# Patient Record
Sex: MALE | Race: WHITE | NOT HISPANIC OR LATINO | Employment: OTHER | URBAN - METROPOLITAN AREA
[De-identification: names, ages, dates, MRNs, and addresses within clinical notes are randomized per-mention and may not be internally consistent; named-entity substitution may affect disease eponyms.]

---

## 2017-03-29 ENCOUNTER — CONSULT (OUTPATIENT)
Dept: CARDIOLOGY | Facility: CLINIC | Age: 47
End: 2017-03-29

## 2017-03-29 VITALS
HEART RATE: 77 BPM | DIASTOLIC BLOOD PRESSURE: 72 MMHG | WEIGHT: 220.2 LBS | BODY MASS INDEX: 30.83 KG/M2 | SYSTOLIC BLOOD PRESSURE: 112 MMHG | HEIGHT: 71 IN

## 2017-03-29 DIAGNOSIS — I48.0 PAROXYSMAL ATRIAL FIBRILLATION (HCC): Primary | ICD-10-CM

## 2017-03-29 DIAGNOSIS — G47.33 OSA (OBSTRUCTIVE SLEEP APNEA): ICD-10-CM

## 2017-03-29 DIAGNOSIS — R55 VASOVAGAL SYNCOPE: ICD-10-CM

## 2017-03-29 PROCEDURE — 93000 ELECTROCARDIOGRAM COMPLETE: CPT | Performed by: INTERNAL MEDICINE

## 2017-03-29 PROCEDURE — 99204 OFFICE O/P NEW MOD 45 MIN: CPT | Performed by: INTERNAL MEDICINE

## 2017-03-29 RX ORDER — ASPIRIN 81 MG/1
81 TABLET ORAL DAILY
COMMUNITY

## 2017-03-29 NOTE — PROGRESS NOTES
Cardiology Consult     Brian Davis  1970  [phone]      03/29/17      [facility] CARDIOLOGY    Jody Eva Sunny, APRN  [address] / [place] [address]    Chief Complaint   Patient presents with   • Atrial Fibrillation       1) Atrial Fibrillation     A)CHADSVASC= 1 on ASA    B)Prior episodes of Afib presenting with syncope- 2 requiring cardioversion     C)Echocardiogram 1/5/17: Normal LV size EF 60%, Trace MR, trace MR mild TR, LA 4.0cm. IVS 1.2, LVPW 1.0      D) Ziopatch 1/2017: results unknown   2) Syncope - probably vasovagal   3)Hypertension   4)Hyperlipidemia   5)Chronic Kidney Disease: Last Cr 1.05 1/4/17  6) Surgical History: cholecystectomy  7) Probable Sleep Apnea: sleep study 1/23/12: 39 snoring episodes with average O2 sat at 93% with lowest at 82%, 119 desats       History of Present Illness:   Mr. Davis is a 57 year old white male who is self referred for consultation for Atrial fibrillation. His History of atrial fibrillation dates back to 2008. In 2008, then in 2010 he had episodes of atrial fibrillation requiring cardioversion. During both of these episodes he had a syncopal episode. He did well for years and was not on any antiarrhythmic medications and did not follow with a cardiologst. Then on New Year's ayala 2016 he a experienced another syncope episode. This was similar to the previous episodes years ago in that he felt hot and sweaty prior to the episode. He felt dizzy and sweaty at his seat and got up to go to the bathroom. He sat down and then passed out. He thinks he was out for a few minutes. When he came to, he was soaked in sweat. He was not confused and did not lose control of his bowel or bladder. He states that his pulse was irregular. He did not go to the ER after this episode. The prior episodes were also in the setting of alcohol intake. He does drink ETOH regularly, he admits to a couple beers 3-4 times per week. He used to drink hard  liquor but has stopped this since January. He drinks 1 cup of coffee daily. BP does not run high. No tobacco. No recent hospitalizations or ER visits. He is going to [facility] on a vacation in May and wanted to be checked out before he goes. He has not had formal sleep study. He does have symptoms such as snoring and apneic spells.     No Known Allergies     Cannot display prior to admission medications because the patient has not been admitted in this contact.            Current Outpatient Prescriptions:   •  aspirin 81 MG EC tablet, Take 81 mg by mouth Daily., Disp: , Rfl:     Social History     Social History   • Marital status:      Spouse name: N/A   • Number of children: N/A   • Years of education: N/A     Social History Main Topics   • Smoking status: Never Smoker   • Smokeless tobacco: Never Used   • Alcohol use Yes   • Drug use: No   • Sexual activity: Defer     Other Topics Concern   • None     Social History Narrative   • None     Family History: Sister has bradycardia, no PM, she has episodes of syncope.      REVIEW OF SYSTEMS:   CONST:  No weight loss, fever, chills, weakness or fatigue.   HEENT:  No visual loss, blurred vision, double vision, yellow sclerae.                   No hearing loss, congestion, sore throat.   SKIN:      No rashes, urticaria, ulcers, sores.     RESP:     No shortness of breath, hemoptysis, cough, sputum.   GI:           No anorexia, nausea, vomiting, diarrhea. No abdominal pain, melena.   :         No burning on urination, hematuria or increased frequency.  ENDO:    No diaphoresis, cold or heat intolerance. No polyuria or polydipsia.   NEURO:  No headache, dizziness, syncope, paralysis, ataxia, or parasthesias.                  No change in bowel or bladder control. No history of CVA/TIA  MUSC:    No muscle, back pain, joint pain or stiffness.   HEME:    No anemia, bleeding, bruising. No history of DVT/PE.  PSYCH:  No history of depression, anxiety    Vitals:    03/29/17  "1011   BP: 112/72   BP Location: Left arm   Patient Position: Sitting   Pulse: 77   Weight: 220 lb 3.2 oz (99.9 kg)   Height: 71\" (180.3 cm)       Physical Exam:  GEN: Well nourished, Well- developed  No acute distress  HEENT: Normocephalic, Atraumatic, PERRLA, moist mucous membranes  NECK: supple, NO JVD, no thyromegaly, no lymphadenopathy  CARD: S1S2  RRR no murmur, gallop, rub  LUNGS: Clear to auscultataion, normal respiratory effort  ABDOMEN: Soft, nontender, normal bowel sounds  EXTREMITIES:No gross deformities,  No clubbing, cyanosis, or edema  SKIN: Warm, dry  NEURO: No focal deficits  PSYCHIATRIC: Normal affect and mood      Data:                                        ECG 12 Lead  Date/Time: 3/29/2017 10:29 AM  Performed by: CARMELINA MEJIA  Authorized by: CARMELINA MEJIA   Rhythm: sinus rhythm  Ectopy: unifocal PVCs  BPM: 77              Assessment and Plan:   1. Paroxysmal atrial fibrillation    2. Vasovagal syncope    3. VALERIE (obstructive sleep apnea)      1. Paroxysmal Atrial Fibrillation- 2 documented and probably a recent third episode, related to ETOH intake and in the setting of vasovagal syncope. He needs to check his heart rates. His echo showed normal Structure and function. He does not want to take medications at this time.   CHADSvasc = 1 continue ASA 81 mg daily. Dr. Mejia has discussed with the patient the use of prn anticoagulation with Xarelto. We have given him samples today.   2. Vasovagal Syncope- usually in the setting of alcohol intake.   3. Probable Sleep Apnea-  Consider outpatient sleep study. Encouraged weight loss and exercise.       Scribed for Carmelina Mejia MD by Jennifer Thomas PA-C. 3/29/2017  11:03 AM     I, Carmelina Mejia MD, personally performed the services face to face as described in this documentation and as scribed by the above named individual in my presence, and it is both accurate and complete.  3/29/2017  11:08 AM            "

## 2017-05-08 ENCOUNTER — TELEPHONE (OUTPATIENT)
Dept: CARDIOLOGY | Facility: CLINIC | Age: 47
End: 2017-05-08

## 2017-05-08 DIAGNOSIS — I47.29 NONSUSTAINED VENTRICULAR TACHYCARDIA (HCC): Primary | ICD-10-CM

## 2017-05-08 DIAGNOSIS — R55 VASOVAGAL SYNCOPE: ICD-10-CM

## 2017-05-17 ENCOUNTER — HOSPITAL ENCOUNTER (OUTPATIENT)
Dept: CARDIOLOGY | Facility: HOSPITAL | Age: 47
Discharge: HOME | End: 2017-05-17
Attending: INTERNAL MEDICINE | Admitting: INTERNAL MEDICINE

## 2017-05-17 ENCOUNTER — HOSPITAL ENCOUNTER (OUTPATIENT)
Dept: CARDIOLOGY | Facility: HOSPITAL | Age: 47
Discharge: HOME | End: 2017-05-17
Attending: INTERNAL MEDICINE

## 2017-05-17 VITALS
SYSTOLIC BLOOD PRESSURE: 142 MMHG | HEIGHT: 71 IN | DIASTOLIC BLOOD PRESSURE: 96 MMHG | BODY MASS INDEX: 29.96 KG/M2 | WEIGHT: 214 LBS | HEART RATE: 72 BPM

## 2017-05-17 DIAGNOSIS — I47.29 NONSUSTAINED VENTRICULAR TACHYCARDIA (HCC): ICD-10-CM

## 2017-05-17 DIAGNOSIS — R55 VASOVAGAL SYNCOPE: ICD-10-CM

## 2017-05-17 LAB
BH CV STRESS BP STAGE 1: NORMAL
BH CV STRESS BP STAGE 2: NORMAL
BH CV STRESS BP STAGE 3: NORMAL
BH CV STRESS BP STAGE 4: NORMAL
BH CV STRESS DURATION MIN STAGE 1: 3
BH CV STRESS DURATION MIN STAGE 2: 3
BH CV STRESS DURATION MIN STAGE 3: 3
BH CV STRESS DURATION MIN STAGE 4: 1
BH CV STRESS DURATION SEC STAGE 1: 0
BH CV STRESS DURATION SEC STAGE 2: 0
BH CV STRESS DURATION SEC STAGE 3: 0
BH CV STRESS DURATION SEC STAGE 4: 41
BH CV STRESS GRADE STAGE 1: 10
BH CV STRESS GRADE STAGE 2: 12
BH CV STRESS GRADE STAGE 3: 14
BH CV STRESS GRADE STAGE 4: 16
BH CV STRESS HR STAGE 1: 100
BH CV STRESS HR STAGE 2: 116
BH CV STRESS HR STAGE 3: 142
BH CV STRESS HR STAGE 4: 155
BH CV STRESS METS STAGE 1: 5
BH CV STRESS METS STAGE 2: 7.5
BH CV STRESS METS STAGE 3: 10
BH CV STRESS METS STAGE 4: 13.5
BH CV STRESS PROTOCOL 1: NORMAL
BH CV STRESS RECOVERY BP: NORMAL MMHG
BH CV STRESS RECOVERY HR: 99 BPM
BH CV STRESS SPEED STAGE 1: 1.7
BH CV STRESS SPEED STAGE 2: 2.5
BH CV STRESS SPEED STAGE 3: 3.4
BH CV STRESS SPEED STAGE 4: 4.2
BH CV STRESS STAGE 1: 1
BH CV STRESS STAGE 2: 2
BH CV STRESS STAGE 3: 3
BH CV STRESS STAGE 4: 4
LV EF NUC BP: 67 %
MAXIMAL PREDICTED HEART RATE: 172 BPM
PERCENT MAX PREDICTED HR: 91.28 %
STRESS BASELINE BP: NORMAL MMHG
STRESS BASELINE HR: 75 BPM
STRESS PERCENT HR: 107 %
STRESS POST ESTIMATED WORKLOAD: 13 METS
STRESS POST EXERCISE DUR MIN: 10 MIN
STRESS POST EXERCISE DUR SEC: 41 SEC
STRESS POST PEAK BP: NORMAL MMHG
STRESS POST PEAK HR: 157 BPM
STRESS TARGET HR: 146 BPM

## 2017-05-17 PROCEDURE — A9500 TC99M SESTAMIBI: HCPCS | Performed by: INTERNAL MEDICINE

## 2017-05-17 PROCEDURE — 78452 HT MUSCLE IMAGE SPECT MULT: CPT

## 2017-05-17 PROCEDURE — 0 TECHNETIUM SESTAMIBI: Performed by: INTERNAL MEDICINE

## 2017-05-17 PROCEDURE — 93017 CV STRESS TEST TRACING ONLY: CPT

## 2017-05-17 PROCEDURE — 78452 HT MUSCLE IMAGE SPECT MULT: CPT | Performed by: INTERNAL MEDICINE

## 2017-05-17 PROCEDURE — 93018 CV STRESS TEST I&R ONLY: CPT | Performed by: INTERNAL MEDICINE

## 2017-05-17 RX ADMIN — Medication 1 DOSE: at 08:45

## 2017-05-17 RX ADMIN — Medication 1 DOSE: at 10:55

## 2017-05-18 ENCOUNTER — APPOINTMENT (OUTPATIENT)
Dept: CARDIOLOGY | Facility: HOSPITAL | Age: 47
End: 2017-05-18
Attending: INTERNAL MEDICINE

## 2017-05-18 ENCOUNTER — DOCUMENTATION (OUTPATIENT)
Dept: CARDIOLOGY | Facility: CLINIC | Age: 47
End: 2017-05-18

## 2024-11-13 ENCOUNTER — OFFICE VISIT (OUTPATIENT)
Dept: CARDIOLOGY | Facility: CLINIC | Age: 54
End: 2024-11-13
Payer: COMMERCIAL

## 2024-11-13 VITALS
BODY MASS INDEX: 31.25 KG/M2 | WEIGHT: 223.2 LBS | HEIGHT: 71 IN | HEART RATE: 68 BPM | DIASTOLIC BLOOD PRESSURE: 84 MMHG | SYSTOLIC BLOOD PRESSURE: 123 MMHG | OXYGEN SATURATION: 97 %

## 2024-11-13 DIAGNOSIS — R55 SYNCOPE AND COLLAPSE: Primary | ICD-10-CM

## 2024-11-13 PROCEDURE — 99204 OFFICE O/P NEW MOD 45 MIN: CPT | Performed by: INTERNAL MEDICINE

## 2024-11-13 PROCEDURE — 93000 ELECTROCARDIOGRAM COMPLETE: CPT | Performed by: INTERNAL MEDICINE

## 2024-11-13 NOTE — PROGRESS NOTES
"Subjective:     Encounter Date:11/13/2024    Primary Care Physician: Jody Correa APRN      Patient ID: Brian Davis is a 54 y.o. male.    Chief Complaint:Consult, Rapid Heart Rate, and Atrial Fibrillation    PROBLEM LIST:  History of atrial fibrillation  Previously seen Dr. Mejia  2017 normal MPS  Recurrence, following an episode of syncope 9/24  Dyslipidemia  Surgeries:  Spinal surgery  Cholecystectomy   Ureter surgery  Elbow surgery      No Known Allergies      Current Outpatient Medications:     aspirin 81 MG EC tablet, Take 81 mg by mouth Daily. (Patient taking differently: Take 1 tablet by mouth Daily As Needed (reduce stroke).), Disp: , Rfl:         History of Present Illness    Patient is a 54-year-old  male who is being seen today for history of atrial fibrillation and syncopal event.  Patient has no previous history of coronary disease.  He denies any cardiac testing in the last 5 years.  Patient notes that in the middle of September he was eating in a restaurant and developed a severe leg cramp.  Patient thinks that he was dehydrated at this time and had subsequent syncopal event.  EMS was called.  And he was awake and while they were there.  He denies any other symptoms prior to syncope.  Has history of what was thought to be vasovagal syncope in the past and has previously been evaluated by Dr. Mejia.  Patient noted that syncope occurred as he stood to go use the restroom.  He has had no further syncope or significant dizziness.  Notes an occasional \"heavy pulse\" very brief in nature.  EKG showed atrial fibrillation with controlled ventricular response, on evaluation in the ER.  Denies any recurrent events.  Notes he is significant back on his alcohol, and his alcohol and stress typically trigger his events.    The following portions of the patient's history were reviewed and updated as appropriate: allergies, current medications, past family history, past medical history, past " "social history, past surgical history and problem list.    Family History   Problem Relation Age of Onset    No Known Problems Mother     No Known Problems Father        Social History     Tobacco Use    Smoking status: Never    Smokeless tobacco: Never   Substance Use Topics    Alcohol use: Yes    Drug use: No         Review of Systems   Constitutional: Negative for fever and malaise/fatigue.   HENT:  Positive for tinnitus. Negative for nosebleeds.    Eyes:  Positive for blurred vision. Negative for redness and visual disturbance.   Cardiovascular:  Positive for syncope. Negative for orthopnea, palpitations and paroxysmal nocturnal dyspnea.   Respiratory:  Positive for cough and snoring. Negative for sputum production and wheezing.    Hematologic/Lymphatic: Negative for bleeding problem.   Skin:  Negative for flushing, itching and rash.   Musculoskeletal:  Negative for falls, joint pain and muscle cramps.   Gastrointestinal:  Negative for abdominal pain, diarrhea, heartburn, nausea and vomiting.   Genitourinary:  Negative for hematuria.   Neurological:  Negative for excessive daytime sleepiness, dizziness, headaches, tremors and weakness.   Psychiatric/Behavioral:  Negative for substance abuse. The patient is not nervous/anxious.           Objective:   /84   Pulse 68   Ht 180.3 cm (71\")   Wt 101 kg (223 lb 3.2 oz)   SpO2 97%   BMI 31.13 kg/m²         Vitals reviewed.   Constitutional:       Appearance: Well-developed and not in distress.   Neck:      Thyroid: No thyromegaly.      Vascular: No carotid bruit or JVD.   Pulmonary:      Breath sounds: Normal breath sounds.   Cardiovascular:      Regular rhythm.      No gallop. No S3 and S4 gallop.   Pulses:     Intact distal pulses.      Carotid: 2+ bilaterally.     Radial: 2+ bilaterally.  Edema:     Peripheral edema absent.   Abdominal:      General: Bowel sounds are normal.      Palpations: Abdomen is soft. There is no abdominal mass.      Tenderness: " There is no abdominal tenderness.   Musculoskeletal:         General: No deformity.      Extremities: No clubbing present.Skin:     General: Skin is warm and dry.      Findings: No rash.   Neurological:      Mental Status: Alert and oriented to person, place, and time.           ECG 12 Lead    Date/Time: 11/13/2024 9:16 AM  Performed by: Jeet Ortiz MD    Authorized by: Jeet Ortiz MD  Comparison: compared with previous ECG from 3/29/2017  Comparison to previous ECG: First-degree block noted now  Rhythm: sinus bradycardia  Conduction: 1st degree AV block                Assessment:   Assessment & Plan      Diagnoses and all orders for this visit:    1. Syncope and collapse (Primary)  -     ECG 12 Lead      1.  Syncope, sounds vasovagal, but was noted to be in A-fib at the time.  2.  Paroxysmal atrial fibrillation.  [id number] 1.  Recently noted to be in A-fib with controlled ventricular response.  In sinus rhythm now.  Seems to per patient, be related to alcohol and stress.  3.  Hypertension well-controlled    Recommendations:  1.  Discussed options with patient.  He is not eager to take any medicines for atrial fibrillation.  It is unclear if that was the cause of his syncope or not.  2.  Given his ChadsVasc of 1, not require anticoagulation at this time.  3.  Check echocardiogram  4.  Placed 30-day event monitor to assess for any A-fib.  If he has recurrent A-fib, may be candidate for PVA.  He is not interested in antiarrhythmics.  5.  Further recommendations after the above       Dora UMANA scribed portions of this dictation for Dr. Jeet Ortiz.     I have seen and examined the patient, I have reviewed the note, discussed the case with the advance practice clinician, made necessary changes and I agree with the final note.    Jeet Ortiz MD  11/13/24  10:18 EST            Dictated utilizing Dragon dictation

## 2024-11-20 ENCOUNTER — HOSPITAL ENCOUNTER (OUTPATIENT)
Facility: HOSPITAL | Age: 54
Discharge: HOME | End: 2024-11-20
Admitting: INTERNAL MEDICINE
Payer: COMMERCIAL

## 2024-11-20 DIAGNOSIS — R55 SYNCOPE AND COLLAPSE: ICD-10-CM

## 2024-11-20 PROCEDURE — 93306 TTE W/DOPPLER COMPLETE: CPT | Performed by: INTERNAL MEDICINE

## 2024-11-20 PROCEDURE — 93306 TTE W/DOPPLER COMPLETE: CPT

## 2024-11-21 LAB
BH CV ECHO MEAS - AO MAX PG: 3.2 MMHG
BH CV ECHO MEAS - AO MEAN PG: 2 MMHG
BH CV ECHO MEAS - AO ROOT DIAM: 2.7 CM
BH CV ECHO MEAS - AO V2 MAX: 88.8 CM/SEC
BH CV ECHO MEAS - AO V2 VTI: 19.5 CM
BH CV ECHO MEAS - AVA(I,D): 1.88 CM2
BH CV ECHO MEAS - EDV(CUBED): 103.8 ML
BH CV ECHO MEAS - EDV(MOD-SP2): 43.5 ML
BH CV ECHO MEAS - EDV(MOD-SP4): 94.6 ML
BH CV ECHO MEAS - EF(MOD-SP2): 64.8 %
BH CV ECHO MEAS - EF(MOD-SP4): 51 %
BH CV ECHO MEAS - ESV(CUBED): 42.9 ML
BH CV ECHO MEAS - ESV(MOD-SP2): 15.3 ML
BH CV ECHO MEAS - ESV(MOD-SP4): 46.4 ML
BH CV ECHO MEAS - FS: 25.5 %
BH CV ECHO MEAS - IVS/LVPW: 1.11 CM
BH CV ECHO MEAS - IVSD: 1 CM
BH CV ECHO MEAS - LA DIMENSION: 3.5 CM
BH CV ECHO MEAS - LAT PEAK E' VEL: 9.5 CM/SEC
BH CV ECHO MEAS - LV DIASTOLIC VOL/BSA (35-75): 42.8 CM2
BH CV ECHO MEAS - LV MASS(C)D: 153.4 GRAMS
BH CV ECHO MEAS - LV MAX PG: 1.43 MMHG
BH CV ECHO MEAS - LV MEAN PG: 1 MMHG
BH CV ECHO MEAS - LV SYSTOLIC VOL/BSA (12-30): 21 CM2
BH CV ECHO MEAS - LV V1 MAX: 59.8 CM/SEC
BH CV ECHO MEAS - LV V1 VTI: 11.7 CM
BH CV ECHO MEAS - LVIDD: 4.7 CM
BH CV ECHO MEAS - LVIDS: 3.5 CM
BH CV ECHO MEAS - LVOT AREA: 3.1 CM2
BH CV ECHO MEAS - LVOT DIAM: 2 CM
BH CV ECHO MEAS - LVPWD: 0.9 CM
BH CV ECHO MEAS - MED PEAK E' VEL: 8.3 CM/SEC
BH CV ECHO MEAS - MV DEC SLOPE: 232 CM/SEC2
BH CV ECHO MEAS - MV DEC TIME: 0.26 SEC
BH CV ECHO MEAS - MV E MAX VEL: 58.7 CM/SEC
BH CV ECHO MEAS - MV MAX PG: 3.3 MMHG
BH CV ECHO MEAS - MV MEAN PG: 1 MMHG
BH CV ECHO MEAS - MV P1/2T: 92.3 MSEC
BH CV ECHO MEAS - MV V2 VTI: 27.5 CM
BH CV ECHO MEAS - MVA(P1/2T): 2.38 CM2
BH CV ECHO MEAS - MVA(VTI): 1.34 CM2
BH CV ECHO MEAS - PA ACC TIME: 0.12 SEC
BH CV ECHO MEAS - SV(LVOT): 36.8 ML
BH CV ECHO MEAS - SV(MOD-SP2): 28.2 ML
BH CV ECHO MEAS - SV(MOD-SP4): 48.2 ML
BH CV ECHO MEAS - SVI(LVOT): 16.6 ML/M2
BH CV ECHO MEAS - SVI(MOD-SP2): 12.8 ML/M2
BH CV ECHO MEAS - SVI(MOD-SP4): 21.8 ML/M2
BH CV ECHO MEAS - TAPSE (>1.6): 3.1 CM
BH CV ECHO MEASUREMENTS AVERAGE E/E' RATIO: 6.6
BH CV XLRA - RV BASE: 3.6 CM
BH CV XLRA - RV LENGTH: 7.9 CM
BH CV XLRA - RV MID: 2.7 CM
BH CV XLRA - TDI S': 11.6 CM/SEC
LV EF 2D ECHO EST: 60 %

## 2024-11-22 ENCOUNTER — TELEPHONE (OUTPATIENT)
Dept: CARDIOLOGY | Facility: CLINIC | Age: 54
End: 2024-11-22
Payer: COMMERCIAL

## 2024-11-22 NOTE — TELEPHONE ENCOUNTER
Spoke with patient, advised of below----- Message from Jeet Ortiz sent at 11/21/2024 10:07 AM EST -----  Normal echo.  No changes to the plan

## 2024-12-04 ENCOUNTER — TELEPHONE (OUTPATIENT)
Dept: CARDIOLOGY | Facility: CLINIC | Age: 54
End: 2024-12-04

## 2024-12-04 NOTE — TELEPHONE ENCOUNTER
Holter Monitor - Angel     We have been trying to reach the patient regarding his holter for a couple of weeks. I was finally able to speak with him this morning. He stated his ECHO was normal, he has almost quit drinking and is feeling a lot better. He stated he doesn't want to wear the monitor at this time. I advised the patient I would make Dr. Ortiz aware.     We have closed the referral for the holter. If Dr. Ortiz would still like for the patient to wear the monitor please reach out to the patient and place a new referral.     Thank you!